# Patient Record
Sex: FEMALE | Race: AMERICAN INDIAN OR ALASKA NATIVE
[De-identification: names, ages, dates, MRNs, and addresses within clinical notes are randomized per-mention and may not be internally consistent; named-entity substitution may affect disease eponyms.]

---

## 2019-10-11 ENCOUNTER — HOSPITAL ENCOUNTER (EMERGENCY)
Dept: HOSPITAL 5 - ED | Age: 19
Discharge: HOME | End: 2019-10-11
Payer: SELF-PAY

## 2019-10-11 VITALS — SYSTOLIC BLOOD PRESSURE: 103 MMHG | DIASTOLIC BLOOD PRESSURE: 54 MMHG
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
## 2019-10-11 DIAGNOSIS — N93.8: ICD-10-CM

## 2019-10-11 DIAGNOSIS — S39.93XA: Primary | ICD-10-CM
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
## 2019-10-11 DIAGNOSIS — J45.909: ICD-10-CM
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
## 2019-10-11 DIAGNOSIS — R10.32: ICD-10-CM
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
## 2019-10-11 LAB
ALBUMIN SERPL-MCNC: 4.9 G/DL (ref 3.9–5)
ALT SERPL-CCNC: 15 UNITS/L (ref 7–56)
BACTERIA #/AREA URNS HPF: (no result) /HPF
BASOPHILS # (AUTO): 0 K/MM3 (ref 0–0.1)
BASOPHILS NFR BLD AUTO: 0.5 % (ref 0–1.8)
BILIRUB UR QL STRIP: (no result)
BLOOD UR QL VISUAL: (no result)
BUN SERPL-MCNC: 11 MG/DL (ref 7–17)
BUN/CREAT SERPL: 16 %
CALCIUM SERPL-MCNC: 9.3 MG/DL (ref 8.4–10.2)
EOSINOPHIL # BLD AUTO: 0.2 K/MM3 (ref 0–0.4)
EOSINOPHIL NFR BLD AUTO: 2 % (ref 0–4.3)
HCT VFR BLD CALC: 41 % (ref 30.3–42.9)
HEMOLYSIS INDEX: 20
HGB BLD-MCNC: 13.9 GM/DL (ref 10.1–14.3)
LYMPHOCYTES # BLD AUTO: 3.3 K/MM3 (ref 1.2–5.4)
LYMPHOCYTES NFR BLD AUTO: 43.4 % (ref 13.4–35)
MCHC RBC AUTO-ENTMCNC: 34 % (ref 30–34)
MCV RBC AUTO: 87 FL (ref 79–97)
MONOCYTES # (AUTO): 0.5 K/MM3 (ref 0–0.8)
MONOCYTES % (AUTO): 6.4 % (ref 0–7.3)
MUCOUS THREADS #/AREA URNS HPF: (no result) /HPF
PH UR STRIP: 8 [PH] (ref 5–7)
PLATELET # BLD: 233 K/MM3 (ref 140–440)
PROT UR STRIP-MCNC: (no result) MG/DL
RBC # BLD AUTO: 4.71 M/MM3 (ref 3.65–5.03)
RBC #/AREA URNS HPF: 1 /HPF (ref 0–6)
UROBILINOGEN UR-MCNC: < 2 MG/DL (ref ?–2)
WBC #/AREA URNS HPF: < 1 /HPF (ref 0–6)

## 2019-10-11 PROCEDURE — 81001 URINALYSIS AUTO W/SCOPE: CPT

## 2019-10-11 PROCEDURE — 81025 URINE PREGNANCY TEST: CPT

## 2019-10-11 PROCEDURE — 36415 COLL VENOUS BLD VENIPUNCTURE: CPT

## 2019-10-11 PROCEDURE — 76856 US EXAM PELVIC COMPLETE: CPT

## 2019-10-11 PROCEDURE — 85025 COMPLETE CBC W/AUTO DIFF WBC: CPT

## 2019-10-11 PROCEDURE — 80053 COMPREHEN METABOLIC PANEL: CPT

## 2019-10-11 PROCEDURE — 76830 TRANSVAGINAL US NON-OB: CPT

## 2019-10-11 NOTE — ULTRASOUND REPORT
ULTRASOUND PELVIS  



INDICATION / CLINICAL INFORMATION: 

Pelvic pain; heavy vaginal bleeding.



TECHNIQUE:

Transabdominal and Transvaginal.

Duplex Color Doppler used: Yes. 



COMPARISON: 

None available



FINDINGS:

UTERUS: Present.  

- Appearance (if present): No significant abnormality.

- Size in cm (if present): 6.3 x 3.6 x 5.1. 

- Endometrial Complex (if present): No significant abnormality.. Thickness in cm (if measured) = 0.2

- Mass lesions: None.

- Additional findings: None.



RIGHT ADNEXA: The right ovary measures 3.2 x 3.1 x 2.6 cm. No significant ovarian cyst or mass. [place]
l color Doppler blood flow.



LEFT ADNEXA: The left ovary measures 3.3 x 2.1 x 2.1 cm. No significant ovarian cyst or mass. Normal 
color Doppler blood flow.



URINARY BLADDER: No significant abnormality. 

FREE FLUID: None.

ADDITIONAL FINDINGS: None.



IMPRESSION:

1. No significant abnormality.



Signer Name: Juanita Holland MD 

Signed: 10/11/2019 2:56 AM

 Workstation Name: [id number]

## 2019-10-11 NOTE — EMERGENCY DEPARTMENT REPORT
ED Female  HPI





- General


Chief complaint: Vaginal Bleeding


Stated complaint: vaginal bleeding


Source: patient, family


Mode of arrival: Ambulatory


Limitations: No Limitations





- History of Present Illness


Initial comments: 





Patient is a nulliparous 19-year-old white female with no past medical history 

presents to the ED with complaint of acute onset persistent severe pelvic pain 

that radiates to the left lower quadrant.  With heavy vaginal bleeding after 

having sexual intercourse about one hour ago.  Patient states that she was 

actively having sexual intercourse when she suddenly felt sharp pain in her 

pelvic area followed by heavy vaginal bleeding about one hour ago.  Patient 

denies use of any sex toys or any abnormal sexual practices, low back pain, 

nausea, vomiting, dizziness, diarrhea, vaginal discharge, urinary frequency and 

urgency, dysuria, fever, chills, change in vision, dyspareunia traumatic injury 

or chest pain or shortness of breath.


MD Complaint: vaginal bleeding, pelvic pain


-: Sudden, hour(s) (1)


Location: suprapubic


Radiation: non-radiating


Severity: severe


Severity scale (0 -10): 8


Quality: sharp, stabbing, aching


Consistency: constant


Improves with: none


Worsens with: intercourse, movement


Are you Pregnant Now?: No


Last Menstrual Period: 10/01/19


EDC: 19


Associated Symptoms: denies other symptoms, vaginal bleeding, abdominal pain 

(Suprapubic, LLQ area).  denies: vaginal discharge, nausea/vomiting, 

fever/chills, headaches, loss of appetite, dysuria, hematuria, rash, seizure, 

shortness of breath, syncope, weakness, other





- Related Data


Sexually active: Yes


: 0


Para: 0


A: 0


                                  Previous Rx's











 Medication  Instructions  Recorded  Last Taken  Type


 


Ibuprofen [Motrin] 800 mg PO Q8HR PRN #20 tablet 10/11/19 Unknown Rx


 


Ondansetron [Zofran Odt] 4 mg PO Q6HR PRN #12 tab.rapdis 10/11/19 Unknown Rx











                                    Allergies











Allergy/AdvReac Type Severity Reaction Status Date / Time


 


erythromycin base Allergy  Swelling Verified 10/11/19 01:13


 


tetanus and diphtheria Allergy  Swelling Verified 10/11/19 01:13





toxoids     














ED Review of Systems


ROS: 


Stated complaint: vaginal bleeding


Other details as noted in HPI





Constitutional: denies: chills, fever


Eyes: denies: eye pain, eye discharge, vision change


ENT: denies: ear pain, throat pain


Respiratory: denies: cough, shortness of breath, wheezing


Cardiovascular: denies: chest pain, palpitations


Endocrine: no symptoms reported


Gastrointestinal: abdominal pain (suprapubic and LLQ pain).  denies: nausea, 

vomiting, diarrhea, hematemesis


Genitourinary: hematuria, abnormal menses (heavy vaginal bleeding), other 

(pelvic pain).  denies: urgency, dysuria, frequency, discharge


Musculoskeletal: denies: back pain, joint swelling, arthralgia


Skin: denies: rash, lesions


Neurological: denies: headache, weakness, paresthesias


Psychiatric: denies: anxiety, depression


Hematological/Lymphatic: denies: easy bleeding, easy bruising





ED Past Medical Hx





- Past Medical History


Previous Medical History?: Yes


Hx Asthma: Yes





- Surgical History


Past Surgical History?: Yes


Additional Surgical History: T&A removed





- Social History


Smoking Status: Never Smoker


Substance Use Type: None





- Medications


Home Medications: 


                                Home Medications











 Medication  Instructions  Recorded  Confirmed  Last Taken  Type


 


Ibuprofen [Motrin] 800 mg PO Q8HR PRN #20 tablet 10/11/19  Unknown Rx


 


Ondansetron [Zofran Odt] 4 mg PO Q6HR PRN #12 tab.rapdis 10/11/19  Unknown Rx














ED Physical Exam





- General


Limitations: No Limitations


General appearance: alert, in no apparent distress





- Head


Head exam: Present: atraumatic, normocephalic, normal inspection





- Eye


Eye exam: Present: normal appearance, PERRL, EOMI





- ENT


ENT exam: Present: normal exam, normal orophraynx, mucous membranes moist, TM's 

normal bilaterally, normal external ear exam





- Neck


Neck exam: Present: normal inspection, full ROM





- Respiratory


Respiratory exam: Present: normal lung sounds bilaterally.  Absent: respiratory 

distress, wheezes, rales, rhonchi, chest wall tenderness, accessory muscle use, 

decreased breath sounds, prolonged expiratory





- Cardiovascular


Cardiovascular Exam: Present: normal rhythm, tachycardia, normal heart sounds.  

Absent: systolic murmur, diastolic murmur, rubs, gallop





- GI/Abdominal


GI/Abdominal exam: Present: soft, tenderness (suprapubic, LLQ area), normal 

bowel sounds.  Absent: guarding, rebound, hyperactive bowel sounds, hypoactive 

bowel sounds, organomegaly





- 


Bi-manual exam: Present: other (Deferred, patient preference)





- Extremities Exam


Extremities exam: Present: normal inspection, full ROM, normal capillary refill





- Back Exam


Back exam: Present: normal inspection, full ROM.  Absent: tenderness, CVA 

tenderness (R), CVA tenderness (L), muscle spasm, paraspinal tenderness





- Neurological Exam


Neurological exam: Present: alert, oriented X3, CN II-XII intact, normal gait, 

reflexes normal





- Psychiatric


Psychiatric exam: Present: normal affect, normal mood





- Skin


Skin exam: Present: warm, dry, intact, normal color.  Absent: rash





ED Course


                                   Vital Signs











  10/11/19





  01:01


 


Temperature 98.6 F


 


Pulse Rate 107 H


 


Respiratory 18





Rate 


 


Blood Pressure 140/80


 


O2 Sat by Pulse 98





Oximetry 














- Reevaluation(s)


Reevaluation #1: 





10/11/19 02:22


This is a 19-year-old female who presented to the ED with heavy vaginal bleeding

and pelvic pain after sexual intercourse but in no ago.  In the ED, patient is 

alert and oriented 3 and is not in distress, but appears to be in pain and 

crying during the physical exam.  Lab test results were reviewed and are all 

unremarkable and nonactionable.  Pelvic ultrasound was also performed.  Patient 

was treated for pain in the ED.  On reevaluation, patient's pain is well-

controlled with medications.  Lab test results were reviewed and are all 

unremarkable and nonactionable.  The transvaginal and pelvic ultrasounds showed 

normal right ovary which measures 3.2 x 3.1 x 2.6 cm. No significant ovarian 

cyst or mass. Normal color Doppler blood flow.  The left ovary also normal and 

measures 3.3 x 2.1 x 2.1 cm. No significant ovarian cyst or mass. Normal color 

Doppler blood flow.  There is no abnormality identified during this procedure.  

Patient was discharged home on pain medications and advised to maintain a 

complete pelvic rest, and to follow-up with the OB/GYN physician in 5-7 days for

reevaluation.  Patient was also advised to return to the ED immediately if 

symptoms get worse.








10/11/19 05:31








ED Medical Decision Making





- Lab Data


Result diagrams: 


                                 10/11/19 01:16





                                 10/11/19 01:29





- Radiology Data


Radiology results: report reviewed, image reviewed





Findings


[address] [address]





Ultrasound Report


Signed





Patient: ONIEL DELGADO#:


[id number]


: 2000 Acct:[id number]





Age/Sex: 19 / F ADM Date: 10/11/19





Loc: ED


Attending Dr:








Ordering Physician: PAVEL HERNANDEZ


Date of Service: 10/11/19


Procedure(s): US transvaginal


Accession Number(s): [id number]





cc: PAVEL HERNANDEZ








ULTRASOUND PELVIS





INDICATION / CLINICAL INFORMATION:


Pelvic pain; heavy vaginal bleeding.





TECHNIQUE:


Transabdominal and Transvaginal.


Duplex Color Doppler used: Yes.





COMPARISON:


None available





FINDINGS:


UTERUS: Present.


- Appearance (if present): No significant abnormality.


- Size in cm (if present): 6.3 x 3.6 x 5.1.


- Endometrial Complex (if present): No significant abnormality.. Thickness in cm

(if measured) =


0.2


- Mass lesions: None.


- Additional findings: None.





RIGHT ADNEXA: The right ovary measures 3.2 x 3.1 x 2.6 cm. No significant 

ovarian cyst or mass.


Normal color Doppler blood flow.





LEFT ADNEXA: The left ovary measures 3.3 x 2.1 x 2.1 cm. No significant ovarian 

cyst or mass.


Normal color Doppler blood flow.





URINARY BLADDER: No significant abnormality.


FREE FLUID: None.


ADDITIONAL FINDINGS: None.





IMPRESSION:


1. No significant abnormality.





Signer Name: Juanita Holland MD


Signed: 10/11/2019 2:56 AM


Workstation Name: [id number]








Transcribed By: [facility]


Dictated By: Juanita Holland MD


Electronically Authenticated By: Juanita Holland MD


Signed Date/Time: 10/11/19 0256











DD/DT: 10/11/19 025





- Medical Decision Making





This is a 19-year-old female who presented to the ED with heavy vaginal bleeding

and pelvic pain after sexual intercourse but in no ago.  In the ED, patient is 

alert and oriented 3 and is not in distress, but appears to be in pain and 

crying during the physical exam.  Lab test results were reviewed and are all 

unremarkable and nonactionable.  Pelvic ultrasound was also performed.  Patient 

was treated for pain in the ED.  On reevaluation, patient's pain is well-

controlled with medications.  Lab test results were reviewed and are all 

unremarkable and nonactionable.  The transvaginal and pelvic ultrasounds showed 

normal right ovary which measures 3.2 x 3.1 x 2.6 cm. No significant ovarian 

cyst or mass. Normal color Doppler blood flow.  The left ovary also normal and 

measures 3.3 x 2.1 x 2.1 cm. No significant ovarian cyst or mass. Normal color 

Doppler blood flow.  There is no abnormality identified during this procedure.  

Patient was discharged home on pain medications and advised to maintain a 

complete pelvic rest, and to follow-up with the OB/GYN physician in 5-7 days for

reevaluation.  Patient was also advised to return to the ED immediately if 

symptoms get worse.





- Differential Diagnosis


Acute pelvic pain; Traumatic vaginal bleeding


Critical care attestation.: 


If time is entered above; I have spent that time in minutes in the direct care 

of this critically ill patient, excluding procedure time.








ED Disposition


Clinical Impression: 


 Dysfunctional uterine hemorrhage





Abdominal pain


Qualifiers:


 Abdominal location: lower abdomen, unspecified Qualified Code(s): R10.30 - 

Lower abdominal pain, unspecified





Vaginal trauma


Qualifiers:


 Encounter type: initial encounter Qualified Code(s): S39.93XA - Unspecified 

injury of pelvis, initial encounter





Disposition:  TO HOME OR SELFCARE


Is pt being admited?: No


Does the pt Need Aspirin: No


Condition: Stable


Instructions:  Dysfunctional Uterine Bleeding (ED), Acute Abdominal Pain (ED)


Additional Instructions: 


Maintain a complete pelvic rest and follow-up with your OB/GYN physician in 5-7 

days for reevaluation.  Take medication with food, drink plenty of fluids.  

Return to the ED immediately if symptoms get worse.


Prescriptions: 


Ibuprofen [Motrin] 800 mg PO Q8HR PRN #20 tablet


 PRN Reason: Pain , Severe (7-10)


Ondansetron [Zofran Odt] 4 mg PO Q6HR PRN #12 tab.rapdis


 PRN Reason: Nausea


Referrals: 


PRIMARY CARE,MD [Primary Care Provider] - 3-5 Days


Time of Disposition: 02:24


Print Language: ENGLISH

## 2019-10-11 NOTE — ULTRASOUND REPORT
ULTRASOUND PELVIS  



INDICATION / CLINICAL INFORMATION: 

Pelvic pain; heavy vaginal bleeding.



TECHNIQUE:

Transabdominal and Transvaginal.

Duplex Color Doppler used: Yes. 



COMPARISON: 

None available



FINDINGS:

UTERUS: Present.  

- Appearance (if present): No significant abnormality.

- Size in cm (if present): 6.3 x 3.6 x 5.1. 

- Endometrial Complex (if present): No significant abnormality.. Thickness in cm (if measured) = 0.2

- Mass lesions: None.

- Additional findings: None.



RIGHT ADNEXA: The right ovary measures 3.2 x 3.1 x 2.6 cm. No significant ovarian cyst or mass. [place]
l color Doppler blood flow.



LEFT ADNEXA: The left ovary measures 3.3 x 2.1 x 2.1 cm. No significant ovarian cyst or mass. Normal 
color Doppler blood flow.



URINARY BLADDER: No significant abnormality. 

FREE FLUID: None.

ADDITIONAL FINDINGS: None.



IMPRESSION:

1. No significant abnormality.



Signer Name: Juanita Holland MD 

Signed: 10/11/2019 2:56 AM

 Workstation Name: [id number]